# Patient Record
Sex: MALE | Race: WHITE | NOT HISPANIC OR LATINO | Employment: FULL TIME | URBAN - METROPOLITAN AREA
[De-identification: names, ages, dates, MRNs, and addresses within clinical notes are randomized per-mention and may not be internally consistent; named-entity substitution may affect disease eponyms.]

---

## 2024-05-03 ENCOUNTER — OFFICE VISIT (OUTPATIENT)
Dept: PRIMARY CARE | Facility: CLINIC | Age: 54
End: 2024-05-03
Payer: COMMERCIAL

## 2024-05-03 VITALS — DIASTOLIC BLOOD PRESSURE: 75 MMHG | SYSTOLIC BLOOD PRESSURE: 115 MMHG | WEIGHT: 210 LBS | BODY MASS INDEX: 29.29 KG/M2

## 2024-05-03 DIAGNOSIS — M54.14 THORACIC RADICULOPATHY: Primary | ICD-10-CM

## 2024-05-03 DIAGNOSIS — M62.89 MUSCLE TIGHTNESS: ICD-10-CM

## 2024-05-03 DIAGNOSIS — L82.1 SEBORRHEIC KERATOSES: ICD-10-CM

## 2024-05-03 PROCEDURE — 99214 OFFICE O/P EST MOD 30 MIN: CPT | Performed by: INTERNAL MEDICINE

## 2024-05-03 NOTE — PROGRESS NOTES
Subjective   Patient ID: Ronak Torres is a 54 y.o. male who presents for No chief complaint on file..    HPI met patient for first time see updated front sheet has stated that he has his left temple area skin changes and his right rib area aches no chest pain no shortness of breath no side effect with over-the-counter medication bowels normal has had the side pain for many years now has had the skin lesion for about 1 year    Past medical history relatively unremarkable    Medications none    Allergies no known drug allergies    Social history no tobacco alcohol rare    Prevention some exercise no recent blood work    Review of Systems    Objective   There were no vitals taken for this visit.    Physical Exam vital signs noted alert and oriented x 3 NCAT no JVD chest clear to auscultation and percussion CV regular rate and rhythm S1-S2 without murmur gallop or rub extremities no clubbing cyanosis or edema normal distal pulses skin there is a typical seborrheic keratoses by the left temple musculoskeletal right rib area there is no distinct tenderness or skin markings the area in question is around the right 10th rib the thoracic spine is relatively straight and the posterior paraspinal muscle is somewhat tight    Assessment/Plan    impression seborrheic keratoses thoracic radiculopathy? Muscle tightness  Plan advised on if he would like a film for the area (does not) of the skin appears normal the muscles appear normal breathing appears normal he will consider and recheck at his regular physical examination with blood work in the meantime back hygiene back stretches tylenol heating pad as needed  for the skin he will continue to watch for sun related issues and sunscreen wearing a hat is fairly complete and may follow-up with dermatology he will call for referral if needed then may recheck as above  [id number]    Level of visit older new reviewed chart